# Patient Record
Sex: FEMALE | URBAN - METROPOLITAN AREA
[De-identification: names, ages, dates, MRNs, and addresses within clinical notes are randomized per-mention and may not be internally consistent; named-entity substitution may affect disease eponyms.]

---

## 2017-12-12 ENCOUNTER — APPOINTMENT (RX ONLY)
Dept: URBAN - METROPOLITAN AREA OTHER 12 | Facility: OTHER | Age: 47
Setting detail: DERMATOLOGY
End: 2017-12-12

## 2017-12-12 DIAGNOSIS — L57.0 ACTINIC KERATOSIS: ICD-10-CM

## 2017-12-12 DIAGNOSIS — L70.8 OTHER ACNE: ICD-10-CM

## 2017-12-12 DIAGNOSIS — B36.8 OTHER SPECIFIED SUPERFICIAL MYCOSES: ICD-10-CM

## 2017-12-12 PROBLEM — J45.909 UNSPECIFIED ASTHMA, UNCOMPLICATED: Status: ACTIVE | Noted: 2017-12-12

## 2017-12-12 PROBLEM — E13.9 OTHER SPECIFIED DIABETES MELLITUS WITHOUT COMPLICATIONS: Status: ACTIVE | Noted: 2017-12-12

## 2017-12-12 PROCEDURE — ? COUNSELING

## 2017-12-12 PROCEDURE — ? OTHER

## 2017-12-12 PROCEDURE — ? LIQUID NITROGEN

## 2017-12-12 PROCEDURE — ? TREATMENT REGIMEN

## 2017-12-12 PROCEDURE — ? PRESCRIPTION

## 2017-12-12 PROCEDURE — 99202 OFFICE O/P NEW SF 15 MIN: CPT | Mod: 25

## 2017-12-12 PROCEDURE — 17000 DESTRUCT PREMALG LESION: CPT

## 2017-12-12 RX ORDER — DOXYCYCLINE 100 MG/1
CAPSULE ORAL
Qty: 60 | Refills: 3 | Status: ERX | COMMUNITY
Start: 2017-12-12

## 2017-12-12 RX ADMIN — DOXYCYCLINE: 100 CAPSULE ORAL at 23:57

## 2017-12-12 ASSESSMENT — LOCATION DETAILED DESCRIPTION DERM
LOCATION DETAILED: SUPERIOR THORACIC SPINE
LOCATION DETAILED: LEFT LATERAL BUCCAL CHEEK
LOCATION DETAILED: LEFT MEDIAL UPPER BACK

## 2017-12-12 ASSESSMENT — LOCATION SIMPLE DESCRIPTION DERM
LOCATION SIMPLE: LEFT UPPER BACK
LOCATION SIMPLE: LEFT CHEEK
LOCATION SIMPLE: UPPER BACK

## 2017-12-12 ASSESSMENT — LOCATION ZONE DERM
LOCATION ZONE: FACE
LOCATION ZONE: TRUNK

## 2017-12-12 NOTE — PROCEDURE: TREATMENT REGIMEN
Initiate Treatment: CeraVe cleanser and cream
Samples Given: Free and Clear shampoo/conditioner \\nCeraVe cleansing bar
Discontinue Regimen: All other skin care products including melaleuca x 1 month, re-introduce 1 product at a time
Detail Level: Zone

## 2017-12-12 NOTE — PROCEDURE: OTHER
Detail Level: Simple
Note Text (......Xxx Chief Complaint.): This diagnosis correlates with the
Other (Free Text): Consider biopsy if not resolved at follow up

## 2017-12-13 RX ORDER — FLUCONAZOLE 150 MG/1
TABLET ORAL
Qty: 7 | Refills: 0 | Status: ERX | COMMUNITY
Start: 2017-12-13

## 2017-12-13 RX ADMIN — FLUCONAZOLE: 150 TABLET ORAL at 00:02

## 2018-01-25 ENCOUNTER — APPOINTMENT (RX ONLY)
Dept: URBAN - METROPOLITAN AREA OTHER 12 | Facility: OTHER | Age: 48
Setting detail: DERMATOLOGY
End: 2018-01-25

## 2018-01-25 DIAGNOSIS — L70.8 OTHER ACNE: ICD-10-CM

## 2018-01-25 DIAGNOSIS — L57.0 ACTINIC KERATOSIS: ICD-10-CM

## 2018-01-25 PROCEDURE — ? TREATMENT REGIMEN

## 2018-01-25 PROCEDURE — 17000 DESTRUCT PREMALG LESION: CPT

## 2018-01-25 PROCEDURE — ? LIQUID NITROGEN

## 2018-01-25 PROCEDURE — 99212 OFFICE O/P EST SF 10 MIN: CPT | Mod: 25

## 2018-01-25 ASSESSMENT — LOCATION ZONE DERM
LOCATION ZONE: TRUNK
LOCATION ZONE: FACE

## 2018-01-25 ASSESSMENT — LOCATION SIMPLE DESCRIPTION DERM
LOCATION SIMPLE: UPPER BACK
LOCATION SIMPLE: LEFT CHEEK

## 2018-01-25 ASSESSMENT — LOCATION DETAILED DESCRIPTION DERM
LOCATION DETAILED: INFERIOR THORACIC SPINE
LOCATION DETAILED: LEFT CENTRAL MANDIBULAR CHEEK

## 2018-01-25 NOTE — PROCEDURE: TREATMENT REGIMEN
Continue Regimen: CeraVe cream for face, and Free and Clear Shampoo and Conditioner
Detail Level: Zone
Otc Regimen: CeraVe SA cream to body (coupon card given)
Plan: Patient has upcoming appointment with pulmonologist, advised to discontinue doxycycline and restart for flares as needed. \\nPatient will call for refills as needed
Otc Regimen: CeraVe cream or Vaseline for itchiness

## 2018-01-25 NOTE — PROCEDURE: LIQUID NITROGEN
Number Of Freeze-Thaw Cycles: 1 freeze-thaw cycle
Consent: The patient's consent was obtained including but not limited to risks of crusting, scabbing, blistering, scarring, darker or lighter pigmentary change, recurrence, incomplete removal and infection.
Detail Level: Simple
Post-Care Instructions: I reviewed with the patient in detail post-care instructions. Patient is to wear sunprotection, and avoid picking at any of the treated lesions. Pt may apply Vaseline to crusted or scabbing areas.
Render Post-Care Instructions In Note?: no
Duration Of Freeze Thaw-Cycle (Seconds): 0

## 2018-07-18 ENCOUNTER — APPOINTMENT (RX ONLY)
Dept: URBAN - METROPOLITAN AREA OTHER 12 | Facility: OTHER | Age: 48
Setting detail: DERMATOLOGY
End: 2018-07-18

## 2018-07-18 DIAGNOSIS — L21.8 OTHER SEBORRHEIC DERMATITIS: ICD-10-CM

## 2018-07-18 DIAGNOSIS — L73.9 FOLLICULAR DISORDER, UNSPECIFIED: ICD-10-CM

## 2018-07-18 DIAGNOSIS — L738 OTHER SPECIFIED DISEASES OF HAIR AND HAIR FOLLICLES: ICD-10-CM

## 2018-07-18 DIAGNOSIS — L24 IRRITANT CONTACT DERMATITIS: ICD-10-CM

## 2018-07-18 DIAGNOSIS — L663 OTHER SPECIFIED DISEASES OF HAIR AND HAIR FOLLICLES: ICD-10-CM

## 2018-07-18 DIAGNOSIS — L91.8 OTHER HYPERTROPHIC DISORDERS OF THE SKIN: ICD-10-CM

## 2018-07-18 PROBLEM — L02.222 FURUNCLE OF BACK [ANY PART, EXCEPT BUTTOCK]: Status: ACTIVE | Noted: 2018-07-18

## 2018-07-18 PROBLEM — L24.9 IRRITANT CONTACT DERMATITIS, UNSPECIFIED CAUSE: Status: ACTIVE | Noted: 2018-07-18

## 2018-07-18 PROCEDURE — ? PRESCRIPTION

## 2018-07-18 PROCEDURE — ? OTHER

## 2018-07-18 PROCEDURE — ? TREATMENT REGIMEN

## 2018-07-18 PROCEDURE — 99213 OFFICE O/P EST LOW 20 MIN: CPT

## 2018-07-18 RX ORDER — DESONIDE 0.5 MG/G
GEL TOPICAL BID
Qty: 1 | Refills: 3 | Status: ERX | COMMUNITY
Start: 2018-07-18

## 2018-07-18 RX ORDER — TRIAMCINOLONE ACETONIDE 1 MG/G
OINTMENT TOPICAL
Qty: 1 | Refills: 3 | Status: ERX | COMMUNITY
Start: 2018-07-18

## 2018-07-18 RX ADMIN — TRIAMCINOLONE ACETONIDE: 1 OINTMENT TOPICAL at 23:57

## 2018-07-18 RX ADMIN — DESONIDE: 0.5 GEL TOPICAL at 23:48

## 2018-07-18 ASSESSMENT — LOCATION SIMPLE DESCRIPTION DERM
LOCATION SIMPLE: LEFT SUPERIOR EYELID
LOCATION SIMPLE: LEFT POSTERIOR AXILLA
LOCATION SIMPLE: GLABELLA
LOCATION SIMPLE: RIGHT SUPERIOR EYELID
LOCATION SIMPLE: RIGHT UPPER BACK

## 2018-07-18 ASSESSMENT — LOCATION ZONE DERM
LOCATION ZONE: EYELID
LOCATION ZONE: AXILLAE
LOCATION ZONE: TRUNK
LOCATION ZONE: FACE

## 2018-07-18 ASSESSMENT — LOCATION DETAILED DESCRIPTION DERM
LOCATION DETAILED: LEFT LATERAL SUPERIOR EYELID
LOCATION DETAILED: RIGHT LATERAL SUPERIOR EYELID
LOCATION DETAILED: GLABELLA
LOCATION DETAILED: LEFT POSTERIOR AXILLA
LOCATION DETAILED: RIGHT MEDIAL UPPER BACK

## 2018-07-18 NOTE — PROCEDURE: TREATMENT REGIMEN
Discontinue Regimen: Loofah use, Honey Skin
Detail Level: Zone
Initiate Treatment: CeraVe cream vs lotion, restart Doxycycline 100 mg BID
Initiate Treatment: Vaseline daily
Detail Level: Simple
Discontinue Regimen: All other topicals
Continue Regimen: Free & Clear shampoo and conditioner
Plan: Doxycycline to cover impetigo
Initiate Treatment: Free and clear zinc shampoo twice weekly, TAC 0.1% ointment QHS, Desonate gel QAM

## 2018-07-18 NOTE — PROCEDURE: OTHER
Note Text (......Xxx Chief Complaint.): This diagnosis correlates with the
Detail Level: Simple
Other (Free Text): 1 skin lesion was removed with a gradle

## 2018-07-19 RX ORDER — DOXYCYCLINE 100 MG/1
TABLET, FILM COATED ORAL
Qty: 60 | Refills: 2 | Status: ERX | COMMUNITY
Start: 2018-07-19

## 2018-07-19 RX ADMIN — DOXYCYCLINE: 100 TABLET, FILM COATED ORAL at 00:08

## 2018-08-28 ENCOUNTER — APPOINTMENT (RX ONLY)
Dept: URBAN - METROPOLITAN AREA OTHER 12 | Facility: OTHER | Age: 48
Setting detail: DERMATOLOGY
End: 2018-08-28

## 2018-08-28 DIAGNOSIS — L98.9 DISORDER OF THE SKIN AND SUBCUTANEOUS TISSUE, UNSPECIFIED: ICD-10-CM

## 2018-08-28 DIAGNOSIS — L24 IRRITANT CONTACT DERMATITIS: ICD-10-CM | Status: RESOLVED

## 2018-08-28 DIAGNOSIS — L21.8 OTHER SEBORRHEIC DERMATITIS: ICD-10-CM | Status: RESOLVED

## 2018-08-28 DIAGNOSIS — L30.4 ERYTHEMA INTERTRIGO: ICD-10-CM

## 2018-08-28 PROBLEM — L24.9 IRRITANT CONTACT DERMATITIS, UNSPECIFIED CAUSE: Status: ACTIVE | Noted: 2018-08-28

## 2018-08-28 PROCEDURE — ? OTHER

## 2018-08-28 PROCEDURE — 11100: CPT

## 2018-08-28 PROCEDURE — 99212 OFFICE O/P EST SF 10 MIN: CPT | Mod: 25

## 2018-08-28 PROCEDURE — ? TREATMENT REGIMEN

## 2018-08-28 PROCEDURE — ? BIOPSY BY PUNCH METHOD

## 2018-08-28 ASSESSMENT — LOCATION DETAILED DESCRIPTION DERM: LOCATION DETAILED: RIGHT LATERAL PROXIMAL UPPER ARM

## 2018-08-28 ASSESSMENT — LOCATION SIMPLE DESCRIPTION DERM: LOCATION SIMPLE: RIGHT UPPER ARM

## 2018-08-28 ASSESSMENT — LOCATION ZONE DERM: LOCATION ZONE: ARM

## 2018-08-28 NOTE — PROCEDURE: OTHER
Note Text (......Xxx Chief Complaint.): This diagnosis correlates with the
Other (Free Text): Staph/strep culture sent to [facility]
Detail Level: Simple

## 2018-08-28 NOTE — PROCEDURE: BIOPSY BY PUNCH METHOD
Anesthesia Volume In Cc: 0.5
Lab Facility: [id number]
Patient Will Remove Sutures At Home?: No
Anesthesia Type: 2% lidocaine with epinephrine
X Depth Of Punch In Cm (Optional): 0
Epidermal Sutures: 4-0 Prolene
Notification Instructions: Patient will be notified of biopsy results. However, patient instructed to call the office if not contacted within 2 weeks.
Hemostasis: None
Was A Bandage Applied: Yes
Home Suture Removal Text: Patient was provided a home suture removal kit and will remove their sutures at home.  If they have any questions or difficulties they will call the office.
Post-Care Instructions: I reviewed with the patient in detail post-care instructions. Patient is to keep the biopsy site dry overnight, and then apply bacitracin twice daily until healed. Patient may apply hydrogen peroxide soaks to remove any crusting.
Lab: -[id number]
Detail Level: Detailed
Dressing: bandage
Consent: Written consent was obtained and risks were reviewed including but not limited to scarring, infection, bleeding, scabbing, incomplete removal, nerve damage and allergy to anesthesia.
Punch Size In Mm: 3
Wound Care: Vaseline
Suture Removal: 7 days
Billing Type: Third-Party Bill
Biopsy Type: H and E

## 2018-08-28 NOTE — PROCEDURE: TREATMENT REGIMEN
Otc Regimen: Zsorb powder daily
Detail Level: Simple
Initiate Treatment: Desonate gel BID and Zeasorb powder QD until clear then Zeasorb QD

## 2018-09-05 ENCOUNTER — APPOINTMENT (RX ONLY)
Dept: URBAN - METROPOLITAN AREA OTHER 12 | Facility: OTHER | Age: 48
Setting detail: DERMATOLOGY
End: 2018-09-05

## 2018-09-05 DIAGNOSIS — Z48.02 ENCOUNTER FOR REMOVAL OF SUTURES: ICD-10-CM

## 2018-09-05 PROCEDURE — ? SUTURE REMOVAL (GLOBAL PERIOD)

## 2018-09-05 ASSESSMENT — LOCATION SIMPLE DESCRIPTION DERM: LOCATION SIMPLE: RIGHT UPPER ARM

## 2018-09-05 ASSESSMENT — LOCATION ZONE DERM: LOCATION ZONE: ARM

## 2018-09-05 ASSESSMENT — LOCATION DETAILED DESCRIPTION DERM: LOCATION DETAILED: RIGHT LATERAL PROXIMAL UPPER ARM

## 2018-09-05 NOTE — PROCEDURE: SUTURE REMOVAL (GLOBAL PERIOD)
Detail Level: Detailed
Add 46218 Cpt? (Important Note: In 2017 The Use Of 58147 Is Being Tracked By Cms To Determine Future Global Period Reimbursement For Global Periods): no